# Patient Record
Sex: MALE | Race: BLACK OR AFRICAN AMERICAN | Employment: UNEMPLOYED | URBAN - METROPOLITAN AREA
[De-identification: names, ages, dates, MRNs, and addresses within clinical notes are randomized per-mention and may not be internally consistent; named-entity substitution may affect disease eponyms.]

---

## 2019-12-17 ENCOUNTER — OFFICE VISIT (OUTPATIENT)
Dept: OTOLARYNGOLOGY | Facility: CLINIC | Age: 44
End: 2019-12-17
Payer: COMMERCIAL

## 2019-12-17 VITALS
BODY MASS INDEX: 29.8 KG/M2 | WEIGHT: 220 LBS | SYSTOLIC BLOOD PRESSURE: 151 MMHG | TEMPERATURE: 99 F | DIASTOLIC BLOOD PRESSURE: 79 MMHG | HEIGHT: 72 IN

## 2019-12-17 DIAGNOSIS — E04.1 THYROID NODULE: Primary | ICD-10-CM

## 2019-12-17 PROCEDURE — 99243 OFF/OP CNSLTJ NEW/EST LOW 30: CPT | Performed by: OTOLARYNGOLOGY

## 2019-12-19 NOTE — PROGRESS NOTES
Esau Henson is a 40year old male. Patient presents with:  Thyroid Problem    HPI:   During a routine office visit with his primary physician the ultrasound was performed of his neck which demonstrates according to him nodule in his thyroid.   He is not sure Left: Normal. TM - Right: Normal, Left: Normal.     ASSESSMENT AND PLAN:   1. Thyroid nodule  He has some slight fullness on exam but I cannot palpate a definitive nodule.   I recommended a full thyroid ultrasound to further evaluate his symptoms and will c

## 2020-01-07 ENCOUNTER — HOSPITAL ENCOUNTER (OUTPATIENT)
Dept: ULTRASOUND IMAGING | Facility: HOSPITAL | Age: 45
Discharge: HOME | End: 2020-01-07
Attending: OTOLARYNGOLOGY
Payer: COMMERCIAL

## 2020-01-07 DIAGNOSIS — E04.1 THYROID NODULE: ICD-10-CM

## 2020-01-07 PROCEDURE — 76536 US EXAM OF HEAD AND NECK: CPT | Performed by: OTOLARYNGOLOGY

## 2020-03-26 ENCOUNTER — TELEPHONE (OUTPATIENT)
Dept: SCHEDULING | Age: 45
End: 2020-03-26

## 2020-12-21 ENCOUNTER — TELEPHONE (OUTPATIENT)
Dept: OTOLARYNGOLOGY | Facility: CLINIC | Age: 45
End: 2020-12-21

## 2020-12-21 DIAGNOSIS — E04.1 THYROID NODULE: Primary | ICD-10-CM

## 2020-12-21 NOTE — TELEPHONE ENCOUNTER
Patient informed he due for repeat  ultrasound of the Thyroid. Patient given number to central scheduling.

## 2021-02-15 ENCOUNTER — HOSPITAL ENCOUNTER (OUTPATIENT)
Dept: ULTRASOUND IMAGING | Age: 46
Discharge: HOME | End: 2021-02-15
Attending: OTOLARYNGOLOGY
Payer: COMMERCIAL

## 2021-02-15 DIAGNOSIS — E04.1 THYROID NODULE: ICD-10-CM

## 2021-02-15 PROCEDURE — 76536 US EXAM OF HEAD AND NECK: CPT | Performed by: OTOLARYNGOLOGY

## 2021-07-01 ENCOUNTER — TELEMEDICINE (OUTPATIENT)
Dept: TELEHEALTH | Age: 46
End: 2021-07-01
Payer: COMMERCIAL

## 2021-07-01 DIAGNOSIS — Z02.9 ADMINISTRATIVE ENCOUNTER: Primary | ICD-10-CM

## 2021-07-01 PROCEDURE — 99499 UNLISTED E&M SERVICE: CPT | Performed by: NURSE PRACTITIONER

## 2021-07-01 NOTE — PROGRESS NOTES
Well nourished 38 yo male with complaints of Mid back pain without injury for 3 days. Also complaining of \"heart racing\" feels like anxiety, inability to sleep for \"months\" \"loosing inches around waist (but not weight loss) for last several weeks.   D

## 2021-07-02 ENCOUNTER — APPOINTMENT (OUTPATIENT)
Dept: GENERAL RADIOLOGY | Age: 46
End: 2021-07-02
Attending: EMERGENCY MEDICINE
Payer: COMMERCIAL

## 2021-07-02 ENCOUNTER — HOSPITAL ENCOUNTER (OUTPATIENT)
Age: 46
Discharge: HOME | End: 2021-07-02
Payer: COMMERCIAL

## 2021-07-02 VITALS
HEIGHT: 72 IN | WEIGHT: 230 LBS | RESPIRATION RATE: 18 BRPM | SYSTOLIC BLOOD PRESSURE: 155 MMHG | BODY MASS INDEX: 31.15 KG/M2 | HEART RATE: 88 BPM | DIASTOLIC BLOOD PRESSURE: 86 MMHG | OXYGEN SATURATION: 100 % | TEMPERATURE: 98 F

## 2021-07-02 DIAGNOSIS — M62.838 SPASM OF MUSCLE: Primary | ICD-10-CM

## 2021-07-02 DIAGNOSIS — M47.9 SPONDYLOSIS: ICD-10-CM

## 2021-07-02 DIAGNOSIS — K21.9 GASTROESOPHAGEAL REFLUX DISEASE WITHOUT ESOPHAGITIS: ICD-10-CM

## 2021-07-02 DIAGNOSIS — R10.13 EPIGASTRIC PAIN: ICD-10-CM

## 2021-07-02 DIAGNOSIS — R00.2 PALPITATIONS: ICD-10-CM

## 2021-07-02 DIAGNOSIS — F41.9 ANXIETY: ICD-10-CM

## 2021-07-02 DIAGNOSIS — M54.9 MID BACK PAIN: ICD-10-CM

## 2021-07-02 LAB
#MXD IC: 0.3 X10ˆ3/UL (ref 0.1–1)
CREAT BLD-MCNC: 1.1 MG/DL
GLUCOSE BLD-MCNC: 87 MG/DL (ref 70–99)
HCT VFR BLD AUTO: 44 %
HGB BLD-MCNC: 14.2 G/DL
ISTAT BUN: 11 MG/DL (ref 7–18)
ISTAT CHLORIDE: 100 MMOL/L (ref 98–112)
ISTAT HEMATOCRIT: 47 %
ISTAT IONIZED CALCIUM FOR CHEM 8: 1.3 MMOL/L (ref 1.12–1.32)
ISTAT POTASSIUM: 3.6 MMOL/L (ref 3.6–5.1)
ISTAT SODIUM: 145 MMOL/L (ref 136–145)
ISTAT TCO2: 30 MMOL/L (ref 21–32)
LYMPHOCYTES # BLD AUTO: 2.6 X10ˆ3/UL (ref 1–4)
LYMPHOCYTES NFR BLD AUTO: 52.8 %
MCH RBC QN AUTO: 30.8 PG (ref 26–34)
MCHC RBC AUTO-ENTMCNC: 32.3 G/DL (ref 31–37)
MCV RBC AUTO: 95.4 FL (ref 80–100)
MIXED CELL %: 6.4 %
NEUTROPHILS # BLD AUTO: 2.1 X10ˆ3/UL (ref 1.5–7.7)
NEUTROPHILS NFR BLD AUTO: 40.8 %
PLATELET # BLD AUTO: 227 X10ˆ3/UL (ref 150–450)
RBC # BLD AUTO: 4.61 X10ˆ6/UL
TROPONIN I BLD-MCNC: <0.02 NG/ML
WBC # BLD AUTO: 5 X10ˆ3/UL (ref 4–11)

## 2021-07-02 PROCEDURE — 71046 X-RAY EXAM CHEST 2 VIEWS: CPT | Performed by: EMERGENCY MEDICINE

## 2021-07-02 PROCEDURE — 36415 COLL VENOUS BLD VENIPUNCTURE: CPT | Performed by: EMERGENCY MEDICINE

## 2021-07-02 PROCEDURE — 93000 ELECTROCARDIOGRAM COMPLETE: CPT | Performed by: EMERGENCY MEDICINE

## 2021-07-02 PROCEDURE — 84484 ASSAY OF TROPONIN QUANT: CPT | Performed by: EMERGENCY MEDICINE

## 2021-07-02 PROCEDURE — 80047 BASIC METABLC PNL IONIZED CA: CPT | Performed by: EMERGENCY MEDICINE

## 2021-07-02 PROCEDURE — 85025 COMPLETE CBC W/AUTO DIFF WBC: CPT | Performed by: EMERGENCY MEDICINE

## 2021-07-02 PROCEDURE — 99204 OFFICE O/P NEW MOD 45 MIN: CPT | Performed by: EMERGENCY MEDICINE

## 2021-07-02 RX ORDER — METHOCARBAMOL 500 MG/1
500 TABLET, FILM COATED ORAL 3 TIMES DAILY PRN
Qty: 10 TABLET | Refills: 0 | Status: SHIPPED | OUTPATIENT
Start: 2021-07-02

## 2021-07-02 NOTE — ED PROVIDER NOTES
Patient Seen in: [facility]      History   Patient presents with:  Cough/URI  Chest Pain  Neck Pain    Stated Complaint: pain in back and neck     HPI/Subjective:   Stacey Escamilla is a 39year old male here for upper back pain.   Patient states and external ear normal.      Nose: Nose normal. No congestion. Mouth/Throat:      Mouth: Mucous membranes are moist.      Pharynx: Oropharynx is clear. No oropharyngeal exudate or posterior oropharyngeal erythema.    Eyes:      Conjunctiva/sclera: Con PLT .0 150.0 - 450.0 X10ˆ3/uL    # Neutrophil 2.1 1.5 - 7.7 X10ˆ3/uL    # Lymphocyte 2.6 1.0 - 4.0 X10ˆ3/uL    # Mixed Cells 0.3 0.1 - 1.0 X10ˆ3/uL    Neutrophil % 40.8 %    Lymphocyte % 52.8 %    Mixed Cell % 6.4 %   POCT ISTAT chem8 cartridge    Co agreement with the plan. I explained to the patient that emergent conditions may arise and to go to the ER for new, worsening or any persistent conditions. All questions answered. No acute distress and cleared for home.     Discussed Case/results with other

## 2021-07-02 NOTE — ED INITIAL ASSESSMENT (HPI)
Chronic neck and upper back pain for over one year seen at [facility] and told it was arthritis and given muscle relaxants. Did a call to [facility] yesterday with complaint of same pain for 3 days. Told to come here today.  Adds vague non specific mid sternal chest pain

## 2022-02-22 ENCOUNTER — TELEPHONE (OUTPATIENT)
Dept: OTOLARYNGOLOGY | Facility: CLINIC | Age: 47
End: 2022-02-22

## 2022-02-22 NOTE — TELEPHONE ENCOUNTER
Patient informed of due thyroid US ,pt verbalized understanding and order will be sent thru [facility] [address].

## 2022-02-23 NOTE — TELEPHONE ENCOUNTER
No active insurance coverage on file. Previous coverage termed 12/31/21. Please confirm and update coverage.

## 2022-03-08 NOTE — TELEPHONE ENCOUNTER
Referral for US will be closed and patient will be considered self pay until active coverage is loaded.

## 2022-03-10 ENCOUNTER — PATIENT MESSAGE (OUTPATIENT)
Dept: OTOLARYNGOLOGY | Facility: CLINIC | Age: 47
End: 2022-03-10

## 2022-03-10 NOTE — TELEPHONE ENCOUNTER
From: Sree Daily  To: Mounika Purchase  Sent: 3/10/2022 11:22 AM [facility]  Subject: Insurance Information     [address],    We will need your updated Insurance information. Please call at .     Thank You    Donna PRINGLE

## (undated) DIAGNOSIS — E04.1 THYROID NODULE: Primary | ICD-10-CM

## (undated) NOTE — LETTER
Masoud Burdick, [address] [facility]  [address]       12/19/19        Patient: Michael Moore   YOB: 1975   Date of Visit: 12/17/2019       Dear  Dr. Nader Luna MD,      Thank you for referring Michael Moore to my prac